# Patient Record
Sex: MALE | Race: WHITE | NOT HISPANIC OR LATINO | Employment: STUDENT | URBAN - METROPOLITAN AREA
[De-identification: names, ages, dates, MRNs, and addresses within clinical notes are randomized per-mention and may not be internally consistent; named-entity substitution may affect disease eponyms.]

---

## 2019-12-23 ENCOUNTER — HOSPITAL ENCOUNTER (OUTPATIENT)
Facility: MEDICAL CENTER | Age: 19
End: 2019-12-23
Attending: SURGERY | Admitting: SURGERY
Payer: COMMERCIAL

## 2019-12-23 ENCOUNTER — ANESTHESIA EVENT (OUTPATIENT)
Dept: SURGERY | Facility: MEDICAL CENTER | Age: 19
End: 2019-12-23
Payer: COMMERCIAL

## 2019-12-23 ENCOUNTER — ANESTHESIA (OUTPATIENT)
Dept: SURGERY | Facility: MEDICAL CENTER | Age: 19
End: 2019-12-23
Payer: COMMERCIAL

## 2019-12-23 VITALS
WEIGHT: 176.37 LBS | TEMPERATURE: 98.5 F | OXYGEN SATURATION: 98 % | SYSTOLIC BLOOD PRESSURE: 109 MMHG | RESPIRATION RATE: 18 BRPM | BODY MASS INDEX: 23.89 KG/M2 | HEART RATE: 67 BPM | HEIGHT: 72 IN | DIASTOLIC BLOOD PRESSURE: 53 MMHG

## 2019-12-23 DIAGNOSIS — G89.18 ACUTE POSTOPERATIVE ABDOMINAL PAIN: ICD-10-CM

## 2019-12-23 DIAGNOSIS — K40.20 NON-RECURRENT BILATERAL INGUINAL HERNIA WITHOUT OBSTRUCTION OR GANGRENE: ICD-10-CM

## 2019-12-23 DIAGNOSIS — R10.9 ACUTE POSTOPERATIVE ABDOMINAL PAIN: ICD-10-CM

## 2019-12-23 PROCEDURE — 160002 HCHG RECOVERY MINUTES (STAT): Performed by: SURGERY

## 2019-12-23 PROCEDURE — 160048 HCHG OR STATISTICAL LEVEL 1-5: Performed by: SURGERY

## 2019-12-23 PROCEDURE — 160035 HCHG PACU - 1ST 60 MINS PHASE I: Performed by: SURGERY

## 2019-12-23 PROCEDURE — 160047 HCHG PACU  - EA ADDL 30 MINS PHASE II: Performed by: SURGERY

## 2019-12-23 PROCEDURE — 502571 HCHG PACK, LAP CHOLE: Performed by: SURGERY

## 2019-12-23 PROCEDURE — 700101 HCHG RX REV CODE 250: Performed by: ANESTHESIOLOGY

## 2019-12-23 PROCEDURE — 160025 RECOVERY II MINUTES (STATS): Performed by: SURGERY

## 2019-12-23 PROCEDURE — 160028 HCHG SURGERY MINUTES - 1ST 30 MINS LEVEL 3: Performed by: SURGERY

## 2019-12-23 PROCEDURE — 700101 HCHG RX REV CODE 250: Performed by: SURGERY

## 2019-12-23 PROCEDURE — 501838 HCHG SUTURE GENERAL: Performed by: SURGERY

## 2019-12-23 PROCEDURE — 501574 HCHG TROCAR, SMTH CAN&SEAL 5: Performed by: SURGERY

## 2019-12-23 PROCEDURE — 700111 HCHG RX REV CODE 636 W/ 250 OVERRIDE (IP): Performed by: ANESTHESIOLOGY

## 2019-12-23 PROCEDURE — A4338 INDWELLING CATHETER LATEX: HCPCS | Performed by: SURGERY

## 2019-12-23 PROCEDURE — 160039 HCHG SURGERY MINUTES - EA ADDL 1 MIN LEVEL 3: Performed by: SURGERY

## 2019-12-23 PROCEDURE — 501529 HCHG TACKER, W/HANDLE: Performed by: SURGERY

## 2019-12-23 PROCEDURE — 160036 HCHG PACU - EA ADDL 30 MINS PHASE I: Performed by: SURGERY

## 2019-12-23 PROCEDURE — A6402 STERILE GAUZE <= 16 SQ IN: HCPCS | Performed by: SURGERY

## 2019-12-23 PROCEDURE — 502240 HCHG MISC OR SUPPLY RC 0272: Performed by: SURGERY

## 2019-12-23 PROCEDURE — C1781 MESH (IMPLANTABLE): HCPCS | Performed by: SURGERY

## 2019-12-23 PROCEDURE — 160009 HCHG ANES TIME/MIN: Performed by: SURGERY

## 2019-12-23 PROCEDURE — 160046 HCHG PACU - 1ST 60 MINS PHASE II: Performed by: SURGERY

## 2019-12-23 PROCEDURE — 501586 HCHG TROCAR, THRD SPIKE 5X55: Performed by: SURGERY

## 2019-12-23 PROCEDURE — 700105 HCHG RX REV CODE 258: Performed by: SURGERY

## 2019-12-23 DEVICE — MESH FLAT SHEET 3 X 6 - (3EA/CA): Type: IMPLANTABLE DEVICE | Status: FUNCTIONAL

## 2019-12-23 RX ORDER — ONDANSETRON 2 MG/ML
INJECTION INTRAMUSCULAR; INTRAVENOUS PRN
Status: DISCONTINUED | OUTPATIENT
Start: 2019-12-23 | End: 2019-12-23 | Stop reason: SURG

## 2019-12-23 RX ORDER — KETOROLAC TROMETHAMINE 30 MG/ML
INJECTION, SOLUTION INTRAMUSCULAR; INTRAVENOUS PRN
Status: DISCONTINUED | OUTPATIENT
Start: 2019-12-23 | End: 2019-12-23 | Stop reason: SURG

## 2019-12-23 RX ORDER — ROCURONIUM BROMIDE 10 MG/ML
INJECTION, SOLUTION INTRAVENOUS PRN
Status: DISCONTINUED | OUTPATIENT
Start: 2019-12-23 | End: 2019-12-23 | Stop reason: SURG

## 2019-12-23 RX ORDER — SODIUM CHLORIDE, SODIUM LACTATE, POTASSIUM CHLORIDE, CALCIUM CHLORIDE 600; 310; 30; 20 MG/100ML; MG/100ML; MG/100ML; MG/100ML
INJECTION, SOLUTION INTRAVENOUS CONTINUOUS
Status: DISCONTINUED | OUTPATIENT
Start: 2019-12-23 | End: 2019-12-23 | Stop reason: HOSPADM

## 2019-12-23 RX ORDER — DIPHENHYDRAMINE HYDROCHLORIDE 50 MG/ML
12.5 INJECTION INTRAMUSCULAR; INTRAVENOUS
Status: DISCONTINUED | OUTPATIENT
Start: 2019-12-23 | End: 2019-12-23 | Stop reason: HOSPADM

## 2019-12-23 RX ORDER — LIDOCAINE HYDROCHLORIDE 40 MG/ML
SOLUTION TOPICAL PRN
Status: DISCONTINUED | OUTPATIENT
Start: 2019-12-23 | End: 2019-12-23 | Stop reason: SURG

## 2019-12-23 RX ORDER — OXYCODONE HCL 5 MG/5 ML
10 SOLUTION, ORAL ORAL
Status: DISCONTINUED | OUTPATIENT
Start: 2019-12-23 | End: 2019-12-23 | Stop reason: HOSPADM

## 2019-12-23 RX ORDER — MIDAZOLAM HYDROCHLORIDE 1 MG/ML
INJECTION INTRAMUSCULAR; INTRAVENOUS PRN
Status: DISCONTINUED | OUTPATIENT
Start: 2019-12-23 | End: 2019-12-23 | Stop reason: SURG

## 2019-12-23 RX ORDER — BUPIVACAINE HYDROCHLORIDE AND EPINEPHRINE 5; 5 MG/ML; UG/ML
INJECTION, SOLUTION EPIDURAL; INTRACAUDAL; PERINEURAL
Status: DISCONTINUED | OUTPATIENT
Start: 2019-12-23 | End: 2019-12-23 | Stop reason: HOSPADM

## 2019-12-23 RX ORDER — OXYCODONE HYDROCHLORIDE 5 MG/1
5 TABLET ORAL EVERY 4 HOURS PRN
Status: DISCONTINUED | OUTPATIENT
Start: 2019-12-23 | End: 2019-12-23 | Stop reason: HOSPADM

## 2019-12-23 RX ORDER — HALOPERIDOL 5 MG/ML
1 INJECTION INTRAMUSCULAR
Status: DISCONTINUED | OUTPATIENT
Start: 2019-12-23 | End: 2019-12-23 | Stop reason: HOSPADM

## 2019-12-23 RX ORDER — ONDANSETRON 2 MG/ML
4 INJECTION INTRAMUSCULAR; INTRAVENOUS
Status: DISCONTINUED | OUTPATIENT
Start: 2019-12-23 | End: 2019-12-23 | Stop reason: HOSPADM

## 2019-12-23 RX ORDER — BUPIVACAINE HYDROCHLORIDE AND EPINEPHRINE 5; 5 MG/ML; UG/ML
INJECTION, SOLUTION EPIDURAL; INTRACAUDAL; PERINEURAL
Status: DISCONTINUED
Start: 2019-12-23 | End: 2019-12-23 | Stop reason: HOSPADM

## 2019-12-23 RX ORDER — CEFAZOLIN SODIUM 1 G/3ML
INJECTION, POWDER, FOR SOLUTION INTRAMUSCULAR; INTRAVENOUS PRN
Status: DISCONTINUED | OUTPATIENT
Start: 2019-12-23 | End: 2019-12-23 | Stop reason: SURG

## 2019-12-23 RX ORDER — OXYCODONE HYDROCHLORIDE 5 MG/1
5 TABLET ORAL EVERY 4 HOURS PRN
Qty: 20 TAB | Refills: 0 | Status: SHIPPED | OUTPATIENT
Start: 2019-12-23 | End: 2019-12-28

## 2019-12-23 RX ORDER — OXYCODONE HCL 5 MG/5 ML
5 SOLUTION, ORAL ORAL
Status: DISCONTINUED | OUTPATIENT
Start: 2019-12-23 | End: 2019-12-23 | Stop reason: HOSPADM

## 2019-12-23 RX ADMIN — ONDANSETRON 4 MG: 2 INJECTION INTRAMUSCULAR; INTRAVENOUS at 13:22

## 2019-12-23 RX ADMIN — FENTANYL CITRATE 25 MCG: 0.05 INJECTION, SOLUTION INTRAMUSCULAR; INTRAVENOUS at 15:17

## 2019-12-23 RX ADMIN — FENTANYL CITRATE 100 MCG: 50 INJECTION, SOLUTION INTRAMUSCULAR; INTRAVENOUS at 14:00

## 2019-12-23 RX ADMIN — FENTANYL CITRATE 250 MCG: 50 INJECTION, SOLUTION INTRAMUSCULAR; INTRAVENOUS at 13:23

## 2019-12-23 RX ADMIN — KETOROLAC TROMETHAMINE 30 MG: 30 INJECTION, SOLUTION INTRAMUSCULAR at 14:21

## 2019-12-23 RX ADMIN — MIDAZOLAM 2 MG: 1 INJECTION INTRAMUSCULAR; INTRAVENOUS at 13:22

## 2019-12-23 RX ADMIN — SODIUM CHLORIDE, POTASSIUM CHLORIDE, SODIUM LACTATE AND CALCIUM CHLORIDE: 600; 310; 30; 20 INJECTION, SOLUTION INTRAVENOUS at 13:10

## 2019-12-23 RX ADMIN — PROPOFOL 200 MG: 10 INJECTION, EMULSION INTRAVENOUS at 13:23

## 2019-12-23 RX ADMIN — CEFAZOLIN 2 G: 330 INJECTION, POWDER, FOR SOLUTION INTRAMUSCULAR; INTRAVENOUS at 13:24

## 2019-12-23 RX ADMIN — ROCURONIUM BROMIDE 30 MG: 10 INJECTION, SOLUTION INTRAVENOUS at 13:23

## 2019-12-23 RX ADMIN — LIDOCAINE HYDROCHLORIDE 160 MG: 40 SOLUTION TOPICAL at 13:24

## 2019-12-23 NOTE — ANESTHESIA TIME REPORT
Anesthesia Start and Stop Event Times     Date Time Event    12/23/2019 1238 Ready for Procedure     1320 Anesthesia Start     1439 Anesthesia Stop        Responsible Staff  12/23/19    Name Role Begin End    Neftaly Anderson M.D. Anesth 1320 1439        Preop Diagnosis (Free Text):  Pre-op Diagnosis     BILATERA INGUINAL HERNIA WITHOUT MENTION OF OBSTRUCTION OR GANGRENE        Preop Diagnosis (Codes):    Post op Diagnosis  Bilateral inguinal hernia, without obstruction or gangrene      Premium Reason  Non-Premium    Comments:

## 2019-12-23 NOTE — ANESTHESIA POSTPROCEDURE EVALUATION
Patient: Sly Rainey    Procedure Summary     Date:  12/23/19 Room / Location:  [facility] ROOM 22 / SURGERY SAME DAY [facility]    Anesthesia Start:  1320 Anesthesia Stop:  1439    Procedure:  REPAIR, HERNIA, INGUINAL, BILATERAL, LAPAROSCOPIC- WITH MESH (Bilateral Abdomen) Diagnosis:  (BILATERA INGUINAL HERNIA WITHOUT MENTION OF OBSTRUCTION OR GANGRENE)    Surgeon:  Familia Carey M.D. Responsible Provider:  Neftaly Anderson M.D.    Anesthesia Type:  general ASA Status:  1          Final Anesthesia Type: general  Last vitals  BP   136/70    Temp   36.9 °C (98.5 °F)    Pulse   98   Resp   16    SpO2   97      Anesthesia Post Evaluation    Patient location during evaluation: PACU  Patient participation: complete - patient participated  Level of consciousness: awake and alert    Airway patency: patent  Anesthetic complications: no  Cardiovascular status: hemodynamically stable  Respiratory status: acceptable  Hydration status: euvolemic    PONV: none           Nurse Pain Score: 0 (NPRS)

## 2019-12-23 NOTE — OR SURGEON
Immediate Post OP Note    PreOp Diagnosis: Bilateral inguinal hernias    PostOp Diagnosis: same    Procedure(s):  REPAIR, HERNIA, INGUINAL, BILATERAL, LAPAROSCOPIC- WITH MESH - Wound Class: Clean    Surgeon(s):  Familia Carey M.D.    Anesthesiologist/Type of Anesthesia:  Anesthesiologist: Neftaly Anderson M.D./General    Surgical Staff:  Assistant: DAMARIS Correa  Circulator: Shannon Valenzuela R.N.; Matilda Yepez RELIAS  Relief Circulator: Vania Sherwood R.N.  Scrub Person: Vickie Bailey    Specimens removed if any:  * No specimens in log *    Estimated Blood Loss: < 10 cc    Findings: bilateral direct inguinal hernias / right cord lipoma / no indirect hernia    Complications: none      12/23/2019 2:35 PM Familia Carey M.D.

## 2019-12-23 NOTE — OP REPORT
DATE OF SERVICE:  12/23/2019    PREOPERATIVE DIAGNOSIS:  Bilateral inguinal hernias.    POSTOPERATIVE DIAGNOSIS:  Bilateral indirect inguinal hernias.    PROCEDURE PERFORMED:  Laparoscopic repair of bilateral inguinal hernias with   mesh.    SURGEON:  Familia Carey MD    ASSISTANT:  MK Washington    ANESTHESIOLOGIST:  Neftaly Anderson MD    ANESTHESIA:  General endotracheal anesthesia, local with 20 mL 0.5% Marcaine   with epinephrine.    SPECIMENS:  None.    BLOOD LOSS:  10 mL.    FINDINGS:  Bilateral direct inguinal hernias present.  Small right cord   lipoma.  No evidence of indirect inguinal hernias present on either side.    COMPLICATIONS: None.     INDICATION:  Patient is a 19-year-old male who was referred for an inguinal   hernia.  On exam, was found to have bilateral inguinal hernias.  Patient was   taken to the OR for repair.    DESCRIPTION OF PROCEDURE:  Patient in supine position, general anesthesia was   administered, abdomen shaved, prepped with ChloraPrep and allowed to dry.    Blake catheter was inserted prior to prep.  Area was widely draped.  Local   anesthesia was infiltrated below the umbilicus, transverse incision made,   blunt dissection used for the fascia.  The fascia was cleaned, was identified,   opened to the right of the midline with scalpel.  Clamps inserted and spread.    Rectus abdominis muscle was identified, was retracted laterally.  Fingers   inserted going inferiorly, dissecting balloon was inserted to the level of the   symphysis.  It was insufflated under direct vision, allowed to collapse,   inflated second time.  I was able to identify essentially all structures   without any difficulty at this time.  Balloon port was inserted.    Preperitoneal space was insufflated.  The patient placed in Trendelenburg.    Two 5 mm ports were placed in the midline under direct vision after   instillation of local anesthesia.  Dissection commenced on the patient's right   side.   The inferior epigastrics were identified.  The cord structures were   identified.  The peritoneal reflection was identified.  Again, further   evaluation and medial to the inferior epigastric vessels revealed a defect.    Contents have been completely reduced.  There was no evidence of an indirect   hernia.  The patient reports switching sides, dissection commenced on the   patient's right side.  Again, there was a direct defect noted.  The contents   were reduced.  The cord structures were isolated lateral to the inferior   epigastric vessels, was able to identify the peritoneal reflection.  Again,   there was no evidence of an indirect hernia.  At this time, 2 Bard 3x6 mesh   were cut in length, tapered medially, notched for the cord and vessels,   inserted into the preperitoneal space, shifted into position, tacked along the   superior ramus, was slit laterally.  Single tack was placed in superolateral   edge of the mesh.  Tacks were placed medial and lateral to the inferior   epigastric vessels as well as tacks on the  superior edge of the mesh above   the defect.  Second mesh was inserted onto the patient's right side, was slid   into position in same fashion, overlapped in the midline.  Tacks placed in the   superior ramus.  Single tack to the superolateral edge, tacks medial and   lateral to the inferior epigastric vessels, tacks above the direct defect and   tacked in the overlap segment.  There was no evidence of bleeding, good lay of   the mesh, preperitoneal space was allowed to collapse.  Ports were removed.    Fascia of the infraumbilical port site, fascia was reapproximated with a   running 0 Vicryl suture.  Subcutaneous tissues were irrigated.  Skin and all   port sites were reapproximated with 4-0 Vicryl subcuticular sutures.  Areas   were cleaned and dried.  Benzoin and Steri-Strips were applied followed by   gauze and Tegaderm.  Patient tolerated procedure well and was extubated in the   OR,  brought to recovery room.  Plan to be discharged to home later today.       ____________________________________     MD HANNA NUÑEZ / LIS    DD:  12/23/2019 14:47:45  DT:  12/23/2019 15:52:42    D#:  [id number]  Job#:  [id number]

## 2019-12-23 NOTE — OR NURSING
1438- Pt to PACU from OR. Report from anesthesia and OR RN. No airway in place, patient on room air. Respirations even and unlabored. VSS. 3 lap incisions with dry gauze, and tegaderm, CDI.    1445- pt c/o neck pain, warm pack place, cold pack placed on lower abdomen.     1452- pt sleeping. VSS, no signs of distress.     1510- pt family at bedside.     1515- pt medicated for pain    1545- patient meets criteria for phase 2    1610- report to Yue SOSA for break coverage.     1645- report from IAN Turner.     1650- pt returned from restroom, reports voiding.     1730-Discharge orders received. Meets discharge criteria at this time. States mild 2/10 pain. No nausea. Tolerating PO. On RA. All lines and monitors discontinued. Reviewed discharge paperwork with Mother. Discussed diet, activity, medications, follow up care and worsening symptoms. No questions at this time. Pt to be discharged to home via private vehicle.

## 2019-12-23 NOTE — ANESTHESIA PROCEDURE NOTES
Airway  Date/Time: 12/23/2019 1:24 PM  Performed by: Neftaly Anderson M.D.  Authorized by: Neftaly Anderson M.D.     Location:  OR  Urgency:  Elective  Difficult Airway: No    Indications for Airway Management:  Anesthesia  Spontaneous Ventilation: absent    Sedation Level:  Deep  Preoxygenated: Yes    Patient Position:  Sniffing  Final Airway Type:  Endotracheal airway  Final Endotracheal Airway:  ETT  Cuffed: Yes    Technique Used for Successful ETT Placement:  Direct laryngoscopy  Insertion Site:  Oral  Blade Type:  Shirley  Laryngoscope Blade/Videolaryngoscope Blade Size:  3  ETT Size (mm):  7.5  Measured from:  Lips  ETT to Lips (cm):  24  Placement Verified by: auscultation and capnometry    Cormack-Lehane Classification:  Grade IIa - partial view of glottis  Number of Attempts at Approach:  1

## 2019-12-23 NOTE — ANESTHESIA QCDR
2019 [facility] Clinical Data Registry (for Quality Improvement)     Postoperative nausea/vomiting risk protocol (Adult = 18 yrs and Pediatric 3-17 yrs)- (430 and 463)  General inhalation anesthetic (NOT TIVA) with PONV risk factors: No  Provision of anti-emetic therapy with at least 2 different classes of agents: N/A  Patient DID NOT receive anti-emetic therapy and reason is documented in Medical Record: N/A    Multimodal Pain Management- (AQI59)  Patient undergoing Elective Surgery (i.e. Outpatient, or ASC, or Prescheduled Surgery prior to Hospital Admission): Yes  Use of Multimodal Pain Management, two or more drugs and/or interventions, NOT including systemic opioids: Yes   Exception: Documented allergy to multiple classes of analgesics:  N/A    PACU assessment of acute postoperative pain prior to Anesthesia Care End- Applies to Patients Age = 18- (ABG7)  Initial PACU pain score is which of the following: < 7/10  Patient unable to report pain score: N/A    Post-anesthetic transfer of care checklist/protocol to PACU/ICU- (426 and 427)  Upon conclusion of case, patient transferred to which of the following locations: PACU/Non-ICU  Use of transfer checklist/protocol: Yes  Exclusion: Service Performed in Patient Hospital Room (and thus did not require transfer): N/A    PACU Reintubation- (AQI31)  General anesthesia requiring endotracheal intubation (ETT) along with subsequent extubation in OR or PACU: No  Required reintubation in the PACU: N/A  Extubation was a planned trial documented in the medical record prior to removal of the original airway device: N/A    Unplanned admission to ICU related to anesthesia service up through end of PACU care- (MD51)  Unplanned admission to ICU (not initially anticipated at anesthesia start time): No

## 2019-12-24 NOTE — OR NURSING
1615 assumed care report recvd vick mcwilliams   1630 - up to bathroom voided dressed and placed into a recliner chair   1645 report back to socrates mcwilliams

## 2019-12-24 NOTE — DISCHARGE INSTRUCTIONS
2  ACTIVITY: Rest and take it easy for the first 24 hours.  A responsible adult is recommended to remain with you during that time.  It is normal to feel sleepy.  We encourage you to not do anything that requires balance, judgment or coordination.    MILD FLU-LIKE SYMPTOMS ARE NORMAL. YOU MAY EXPERIENCE GENERALIZED MUSCLE ACHES, THROAT IRRITATION, HEADACHE AND/OR SOME NAUSEA.    FOR 24 HOURS DO NOT:  Drive, operate machinery or run household appliances.  Drink beer or alcoholic beverages.   Make important decisions or sign legal documents.    SPECIAL INSTRUCTIONS:     No lifting > 20 lbs x 4 weeks.   Follow up in office in 10 - 14 days   Ice pack to incisions intermittently to reduce swelling and pain    DIET: To avoid nausea, slowly advance diet as tolerated, avoiding spicy or greasy foods for the first day.  Add more substantial food to your diet according to your physician's instructions.  Babies can be fed formula or breast milk as soon as they are hungry.  INCREASE FLUIDS AND FIBER TO AVOID CONSTIPATION.    SURGICAL DRESSING/BATHING:   OK to shower when dressing removed / no bath x 2 weeks.   Patient to remove dressing in 48 - 72 hrs.     FOLLOW-UP APPOINTMENT:  A follow-up appointment should be arranged with your doctor in 10-14 days; call to schedule.    You should CALL YOUR PHYSICIAN if you develop:  Fever greater than 101 degrees F.  Pain not relieved by medication, or persistent nausea or vomiting.  Excessive bleeding (blood soaking through dressing) or unexpected drainage from the wound.  Extreme redness or swelling around the incision site, drainage of pus or foul smelling drainage.  Inability to urinate or empty your bladder within 8 hours.  Problems with breathing or chest pain.    You should call 911 if you develop problems with breathing or chest pain.  If you are unable to contact your doctor or surgical center, you should go to the nearest emergency room or urgent care center.  Physician's  telephone #: [phone]    If any questions arise, call your doctor.  If your doctor is not available, please feel free to call the Surgical Center at [phone].  The Center is open Monday through Friday from 7AM to 7PM.  You can also call the HEALTH HOTLINE open 24 hours/day, 7 days/week and speak to a nurse at [phone], or toll free at [phone].    A registered nurse may call you a few days after your surgery to see how you are doing after your procedure.    MEDICATIONS: Resume taking daily medication.  Take prescribed pain medication with food.  If no medication is prescribed, you may take non-aspirin pain medication if needed.  PAIN MEDICATION CAN BE VERY CONSTIPATING.  Take a stool softener or laxative such as senokot, pericolace, or milk of magnesia if needed.    Prescription given for Oxycodone.    If your physician has prescribed pain medication that includes Acetaminophen (Tylenol), do not take additional Acetaminophen (Tylenol) while taking the prescribed medication.    Depression / Suicide Risk    As you are discharged from this [facility] facility, it is important to learn how to keep safe from harming yourself.    Recognize the warning signs:  · Abrupt changes in personality, positive or negative- including increase in energy   · Giving away possessions  · Change in eating patterns- significant weight changes-  positive or negative  · Change in sleeping patterns- unable to sleep or sleeping all the time   · Unwillingness or inability to communicate  · Depression  · Unusual sadness, discouragement and loneliness  · Talk of wanting to die  · Neglect of personal appearance   · Rebelliousness- reckless behavior  · Withdrawal from people/activities they love  · Confusion- inability to concentrate     If you or a loved one observes any of these behaviors or has concerns about self-harm, here's what you can do:  · Talk about it- your feelings and reasons for harming yourself  · Remove any  means that you might use to hurt yourself (examples: pills, rope, extension cords, firearm)  · Get professional help from the community (Mental Health, Substance Abuse, psychological counseling)  · Do not be alone:Call your Safe Contact- someone whom you trust who will be there for you.  · Call your local CRISIS HOTLINE 896-2668 or 295-491-3800  · Call your local Children's Mobile Crisis Response Team [place] (707) 501-8542 or www.Night Up  · Call the toll free National Suicide Prevention Hotlines   · National Suicide Prevention Lifeline 947-307-PEAL (4069)  · National Hope Line Network 800-SUICIDE (665-6017)

## (undated) DEVICE — GLOVE BIOGEL SZ 7.5 SURGICAL PF LTX - (50PR/BX 4BX/CA)

## (undated) DEVICE — TUBE CONNECTING SUCTION - CLEAR PLASTIC STERILE 72 IN (50EA/CA)

## (undated) DEVICE — TROCARCANN&SEAL 5X55 ZTHREAD - 12/BX

## (undated) DEVICE — SODIUM CHL IRRIGATION 0.9% 1000ML (12EA/CA)

## (undated) DEVICE — SUTURE 4-0 VICRYL PLUS FS-2 - 27 INCH (36/BX)

## (undated) DEVICE — TRAY FOLEY CATHETER STATLOCK 16FR SURESTEP  (10EA/CA)

## (undated) DEVICE — PROTECTOR ULNA NERVE - (36PR/CA)

## (undated) DEVICE — TUBING SETDISPOS HIGH FLOW II - (10/BX)

## (undated) DEVICE — Device

## (undated) DEVICE — BENZOIN SPRAY, TINCTURE

## (undated) DEVICE — CANISTER SUCTION 3000ML MECHANICAL FILTER AUTO SHUTOFF MEDI-VAC NONSTERILE LF DISP  (40EA/CA)

## (undated) DEVICE — ELECTRODE DUAL RETURN W/ CORD - (50/PK)

## (undated) DEVICE — DRESSING TRANSPARENT FILM TEGADERM 2.375 X 2.75"  (100EA/BX)"

## (undated) DEVICE — TROCAR5X55 KII SHIELDED SYS - (6/BX)

## (undated) DEVICE — SPONGE GAUZE NON-STERILE 2X2 - 4-PLY (200/PK 40PK/CA)

## (undated) DEVICE — GLOVE SZ 7.5 BIOGEL PI MICRO - PF LF (50PR/BX)

## (undated) DEVICE — SPONGE GAUZESTER. 2X2 4-PL - (2/PK 50PK/BX 30BX/CS)

## (undated) DEVICE — SUTURE GENERAL

## (undated) DEVICE — GOWN SURGEONS X-LARGE - DISP. (30/CA)

## (undated) DEVICE — SUCTION INSTRUMENT YANKAUER BULBOUS TIP W/O VENT (50EA/CA)

## (undated) DEVICE — TUBE TACKER W/HANDLE (5EA/CA)

## (undated) DEVICE — GLOVE BIOGEL INDICATOR SZ 6.5 SURGICAL PF LTX - (50PR/BX 4BX/CA)

## (undated) DEVICE — GLOVE BIOGEL SZ 6 PF LATEX - (50EA/BX 4BX/CA)

## (undated) DEVICE — MASK ANESTHESIA ADULT  - (100/CA)

## (undated) DEVICE — LACTATED RINGERS INJ 1000 ML - (14EA/CA 60CA/PF)

## (undated) DEVICE — PACK LAP CHOLE OR - (2EA/CA)

## (undated) DEVICE — GLOVE SZ 7 BIOGEL PI MICRO - PF LF (50PR/BX 4BX/CA)

## (undated) DEVICE — GLOVE SZ 6 BIOGEL PI MICRO - PF LF (50PR/BX 4BX/CA)

## (undated) DEVICE — SUTURE 0 VICRYL PLUS UR-6 - 27 INCH (36/BX)

## (undated) DEVICE — NEPTUNE 4 PORT MANIFOLD - (20/PK)

## (undated) DEVICE — GLOVE BIOGEL SZ 7 SURGICAL PF LTX - (50PR/BX 4BX/CA)

## (undated) DEVICE — ANTI-FOG SOLUTION - 60BTL/CA

## (undated) DEVICE — CANISTER SUCTION RIGID RED 1500CC (40EA/CA)

## (undated) DEVICE — TUBING CLEARLINK DUO-VENT - C-FLO (48EA/CA)

## (undated) DEVICE — STERI STRIP COMPOUND BENZOIN - TINCTURE 0.6ML WITH APPLICATOR (40EA/BX)

## (undated) DEVICE — KIT  I.V. START (100EA/CA)

## (undated) DEVICE — CLOSURE SKIN STRIP 1/2 X 4 IN - (STERI STRIP) (50/BX 4BX/CA)

## (undated) DEVICE — CHLORAPREP 26 ML APPLICATOR - ORANGE TINT(25/CA)

## (undated) DEVICE — SENSOR SPO2 NEO LNCS ADHESIVE (20/BX) SEE USER NOTES

## (undated) DEVICE — KIT ANESTHESIA W/CIRCUIT & 3/LT BAG W/FILTER (20EA/CA)

## (undated) DEVICE — CATHETER IV 20 GA X 1-1/4 ---SURG.& SDS ONLY--- (50EA/BX)

## (undated) DEVICE — WATER IRRIGATION STERILE 1000ML (12EA/CA)

## (undated) DEVICE — GOWN WARMING STANDARD FLEX - (30/CA)

## (undated) DEVICE — SET LEADWIRE 5 LEAD BEDSIDE DISPOSABLE ECG (1SET OF 5/EA)

## (undated) DEVICE — SLEEVE, VASO, THIGH, MED

## (undated) DEVICE — HEAD HOLDER JUNIOR/ADULT